# Patient Record
Sex: FEMALE | Race: BLACK OR AFRICAN AMERICAN
[De-identification: names, ages, dates, MRNs, and addresses within clinical notes are randomized per-mention and may not be internally consistent; named-entity substitution may affect disease eponyms.]

---

## 2017-08-04 ENCOUNTER — HOSPITAL ENCOUNTER (OUTPATIENT)
Dept: HOSPITAL 61 - PCVCIMAG | Age: 82
Discharge: HOME | End: 2017-08-04
Attending: INTERNAL MEDICINE
Payer: COMMERCIAL

## 2017-08-04 DIAGNOSIS — I42.8: ICD-10-CM

## 2017-08-04 DIAGNOSIS — I11.0: ICD-10-CM

## 2017-08-04 DIAGNOSIS — Z79.82: ICD-10-CM

## 2017-08-04 DIAGNOSIS — I25.10: ICD-10-CM

## 2017-08-04 DIAGNOSIS — E78.5: ICD-10-CM

## 2017-08-04 DIAGNOSIS — I50.20: ICD-10-CM

## 2017-08-04 DIAGNOSIS — I08.3: Primary | ICD-10-CM

## 2017-08-04 PROCEDURE — 93306 TTE W/DOPPLER COMPLETE: CPT

## 2017-08-07 NOTE — PCVCIMAG
--------------- APPROVED REPORT --------------





Study performed:  2017 13:29:45



EXAM: Comprehensive 2D, Doppler, and color-flow 

Echocardiogram

Patient Location: Echo lab

Status:  routine



Other Information 

Study Quality: 

Adequate



Indications

Cardiomyopathy

Hypertension/HDD

Hypertrophic cardiomyopathy



2D Dimensions

IVSd:  14.36 (7-11mm)LVOT Diam:  19.14 (18-24mm) 

LVDd:  35.49 mm

PWd:  13.19 (7-11mm)Ascending Ao:  34.40 (22-36mm)

LVDs:  14.86 (25-40mm)

Left Atrium:  43.96 (27-40mm)

Aortic Root:  21.23 mm

LV Single Plane 4CH:  66.29 %

LV Single Plane 2CH:  74.42 %Silver's LVEF:  70.36 %

Biplane EF:  69.7 %



Volumes

Left Atrial Volume (Systole)

Single Plane 4CH:  75.59 mLSingle Plane 2CH:  68.57 mL

Biplane LA Volume:  73.00 mLLA ESV Index:  45.00 mL/m2



Aortic Valve

AoV Peak Ravi.:  2.47 m/s

AO Peak Gr.:  25.04 mmHgLVOT Max P.12 mmHg

AO Mean Gr.:  14.23 mmHgLVOT Mean PG:  3.17 mmHg

AO V2 Mean:  1.81 m/sLVOT Max V:  1.17 m/s

AO V2 VTI:  61.24 cmLVOT Mean V:  0.82 m/s

RANDOLPH (VTI):  1.29 ad0CTMG V1 VTI:  27.49 cm

RANDOLPH Vmax: 1.36 cm2

SV (LVOT):  79.07 mL



Mitral Valve

E/A Ratio:  0.9

MV Decel. Time:  298.80 ms

MV E Max Ravi.:  1.16 m/s

MV A Ravi.:  1.27 m/s

IVRT:  163.01 ms



TDI

E/Lateral E':  19.33E/Medial E':  23.20

Medial E' Ravi.:  0.05 m/s

Lateral E' Ravi.:  0.06 m/s



Pulmonary Valve

PV Peak Ravi.:  0.92 m/sPV Peak Gr.:  3.42 mmHg



Pulmonary Vein

P Vein S:    0.64 m/sP Vein A:  0.25 m/s

P Vein D:   0.30 m/sP Vein A Dur.:  133.8 msec

P Vein S/D Ratio:  2.13



Tricuspid Valve

TR Peak Ravi.:  3.14 m/s

TR Peak Gr.:  39.38 mmHg

TV Vmax:  0.86 m/sPA Pressure:  46.00 mmHg



Left Ventricle

The left ventricle is normal size. There is normal LV segmental wall 

motion. Moderate concentric left ventricular hypertrophy. 

Moderate-severe basal septal hypertrophy is present.Intra-cavitary 

gradient is present. Left ventricular systolic function is normal. 

The left ventricular ejection fraction is within the normal range. 

LVEF is 65-70%. The left ventricular diastolic function is normal. 

Left atrial pressure is elevated.



Right Ventricle

The right ventricle is normal size. The right ventricular systolic 

function is normal.



Atria

Left atrium is moderately dilated. Right atrium is moderately 

dilated.



Aortic Valve

Aortic valve is trileaflet. Aortic valve leaflets are mildly 

thickened. No aortic regurgitation is present. Moderate aortic 

stenosis. Highest mean aortic valve gradient is 14mmHg. Peak aortic 

valve gradient is 24mmHg. Calculated RANDOLPH by the continuity equation 

is1.4cm2.



Mitral Valve

Moderate mitral annular calcification. The mitral valvee anulus is 

moderately thickened but opens well. Mild to moderate mitral 

regurgitation. No evidence of mitral valve stenosis.



Tricuspid Valve

The tricuspid valve is normal in structure. Moderate to severe 

tricuspid regurgitation.



Pulmonic Valve

The pulmonary valve is normal in structure. There is no pulmonic 

valvular regurgitation.



Great Vessels

The aortic root is normal in size. The ascending aorta is normal in 

size. IVC is normal in size and collapses with &gt;50% 

inspiration



Pericardium

There is no pericardial effusion. There is no pleural 

effusion.



&lt;Conclusion&gt;

The left ventricle is normal size.

Moderate concentric left ventricular hypertrophy.

Moderate-severe  basal septal hypertrophy is present.Intra-cavitary 

gradient is present.

LVEF is 65-70%.

Left atrium is moderately dilated.

Right atrium is moderately dilated.

Aortic valve is trileaflet.

Aortic valve leaflets are mildly thickened.

Moderate aortic stenosis.

Highest mean aortic valve gradient is 14mmHg.

Peak aortic valve gradient is 24mmHg.

Calculated RANDOLPH by the continuity equation is1.4cm2.

Moderate mitral annular calcification.

The mitral valvee anulus is moderately thickened but opens well.

Mild to moderate mitral regurgitation.

The tricuspid valve is normal in structure.

Moderate to severe tricuspid regurgitation.

The pulmonary valve is normal in structure.

## 2017-08-22 ENCOUNTER — HOSPITAL ENCOUNTER (OUTPATIENT)
Dept: HOSPITAL 61 - PCVCCLINIC | Age: 82
Discharge: HOME | End: 2017-08-22
Attending: INTERNAL MEDICINE
Payer: COMMERCIAL

## 2017-08-22 DIAGNOSIS — I42.2: Primary | ICD-10-CM

## 2017-08-22 DIAGNOSIS — E78.5: ICD-10-CM

## 2017-08-22 DIAGNOSIS — Z96.641: ICD-10-CM

## 2017-08-22 DIAGNOSIS — I25.2: ICD-10-CM

## 2017-08-22 DIAGNOSIS — I10: ICD-10-CM

## 2017-08-22 DIAGNOSIS — I25.10: ICD-10-CM

## 2017-08-22 DIAGNOSIS — Z79.82: ICD-10-CM

## 2017-08-22 PROCEDURE — G0463 HOSPITAL OUTPT CLINIC VISIT: HCPCS

## 2017-08-22 PROCEDURE — 93005 ELECTROCARDIOGRAM TRACING: CPT

## 2018-07-14 ENCOUNTER — HOSPITAL ENCOUNTER (INPATIENT)
Dept: HOSPITAL 35 - ER | Age: 83
LOS: 2 days | Discharge: HOME HEALTH | DRG: 689 | End: 2018-07-16
Attending: INTERNAL MEDICINE | Admitting: INTERNAL MEDICINE
Payer: COMMERCIAL

## 2018-07-14 VITALS — HEIGHT: 59.02 IN | WEIGHT: 164 LBS | BODY MASS INDEX: 33.06 KG/M2

## 2018-07-14 VITALS — DIASTOLIC BLOOD PRESSURE: 92 MMHG | SYSTOLIC BLOOD PRESSURE: 171 MMHG

## 2018-07-14 VITALS — SYSTOLIC BLOOD PRESSURE: 160 MMHG | DIASTOLIC BLOOD PRESSURE: 72 MMHG

## 2018-07-14 VITALS — DIASTOLIC BLOOD PRESSURE: 105 MMHG | SYSTOLIC BLOOD PRESSURE: 192 MMHG

## 2018-07-14 VITALS — SYSTOLIC BLOOD PRESSURE: 168 MMHG | DIASTOLIC BLOOD PRESSURE: 104 MMHG

## 2018-07-14 DIAGNOSIS — F03.90: ICD-10-CM
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
## 2018-07-14 DIAGNOSIS — I25.10: ICD-10-CM

## 2018-07-14 DIAGNOSIS — Z83.3: ICD-10-CM

## 2018-07-14 DIAGNOSIS — Z79.899: ICD-10-CM
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
## 2018-07-14 DIAGNOSIS — E78.00: ICD-10-CM

## 2018-07-14 DIAGNOSIS — Z96.651: ICD-10-CM

## 2018-07-14 DIAGNOSIS — E87.6: ICD-10-CM

## 2018-07-14 DIAGNOSIS — I10: ICD-10-CM

## 2018-07-14 DIAGNOSIS — Z82.49: ICD-10-CM

## 2018-07-14 DIAGNOSIS — E43: ICD-10-CM

## 2018-07-14 DIAGNOSIS — N39.0: Primary | ICD-10-CM

## 2018-07-14 DIAGNOSIS — M62.84: ICD-10-CM

## 2018-07-14 DIAGNOSIS — Z79.82: ICD-10-CM

## 2018-07-14 DIAGNOSIS — I25.2: ICD-10-CM

## 2018-07-14 DIAGNOSIS — E78.5: ICD-10-CM

## 2018-07-14 DIAGNOSIS — M16.0: ICD-10-CM

## 2018-07-14 LAB
ANION GAP SERPL CALC-SCNC: 10 MMOL/L (ref 7–16)
BACTERIA #/AREA URNS HPF: (no result) /HPF
BASOPHILS NFR BLD AUTO: 0.8 % (ref 0–2)
BILIRUB UR-MCNC: NEGATIVE MG/DL
BUN SERPL-MCNC: 29 MG/DL (ref 7–18)
CALCIUM SERPL-MCNC: 9.3 MG/DL (ref 8.5–10.1)
CHLORIDE SERPL-SCNC: 104 MMOL/L (ref 98–107)
CO2 SERPL-SCNC: 25 MMOL/L (ref 21–32)
COLOR UR: YELLOW
CREAT SERPL-MCNC: 1.1 MG/DL (ref 0.6–1)
EOSINOPHIL NFR BLD: 0.1 % (ref 0–3)
ERYTHROCYTE [DISTWIDTH] IN BLOOD BY AUTOMATED COUNT: 17.9 % (ref 10.5–14.5)
GLUCOSE SERPL-MCNC: 143 MG/DL (ref 74–106)
GRANULOCYTES NFR BLD MANUAL: 85.3 % (ref 36–66)
HCT VFR BLD CALC: 33.8 % (ref 37–47)
HGB BLD-MCNC: 11.1 GM/DL (ref 12–15)
KETONES UR STRIP-MCNC: (no result) MG/DL
LYMPHOCYTES NFR BLD AUTO: 5.2 % (ref 24–44)
MCH RBC QN AUTO: 27 PG (ref 26–34)
MCHC RBC AUTO-ENTMCNC: 33 G/DL (ref 28–37)
MCV RBC: 81.9 FL (ref 80–100)
MONOCYTES NFR BLD: 8.6 % (ref 1–8)
NEUTROPHILS # BLD: 10.1 THOU/UL (ref 1.4–8.2)
NITRITE UR QL STRIP: POSITIVE
PLATELET # BLD: 185 THOU/UL (ref 150–400)
POTASSIUM SERPL-SCNC: 2.7 MMOL/L (ref 3.5–5.1)
RBC # BLD AUTO: 4.12 MIL/UL (ref 4.2–5)
RBC # UR STRIP: (no result) /UL
RBC #/AREA URNS HPF: (no result) /HPF (ref 0–2)
SODIUM SERPL-SCNC: 139 MMOL/L (ref 136–145)
SP GR UR STRIP: 1.02 (ref 1–1.03)
URINE CLARITY: (no result)
URINE GLUCOSE-RANDOM*: NEGATIVE
URINE LEUKOCYTES: (no result)
URINE PROTEIN (DIPSTICK): (no result)
UROBILINOGEN UR STRIP-ACNC: 0.2 E.U./DL (ref 0.2–1)
WBC # BLD AUTO: 11.8 THOU/UL (ref 4–11)
WBC #/AREA URNS HPF: (no result) /HPF (ref 0–5)

## 2018-07-14 PROCEDURE — 10084: CPT

## 2018-07-15 VITALS — SYSTOLIC BLOOD PRESSURE: 169 MMHG | DIASTOLIC BLOOD PRESSURE: 80 MMHG

## 2018-07-15 VITALS — SYSTOLIC BLOOD PRESSURE: 156 MMHG | DIASTOLIC BLOOD PRESSURE: 63 MMHG

## 2018-07-15 VITALS — SYSTOLIC BLOOD PRESSURE: 168 MMHG | DIASTOLIC BLOOD PRESSURE: 89 MMHG

## 2018-07-15 LAB
ANION GAP SERPL CALC-SCNC: 7 MMOL/L (ref 7–16)
ANISOCYTOSIS BLD QL SMEAR: (no result)
BUN SERPL-MCNC: 27 MG/DL (ref 7–18)
CALCIUM SERPL-MCNC: 8.6 MG/DL (ref 8.5–10.1)
CHLORIDE SERPL-SCNC: 107 MMOL/L (ref 98–107)
CO2 SERPL-SCNC: 27 MMOL/L (ref 21–32)
CREAT SERPL-MCNC: 1 MG/DL (ref 0.6–1)
EOSINOPHIL NFR BLD: 3 % (ref 0–3)
ERYTHROCYTE [DISTWIDTH] IN BLOOD BY AUTOMATED COUNT: 17.2 % (ref 10.5–14.5)
GLUCOSE SERPL-MCNC: 131 MG/DL (ref 74–106)
GRANULOCYTES NFR BLD MANUAL: 74 % (ref 36–66)
HCT VFR BLD CALC: 31.5 % (ref 37–47)
HGB BLD-MCNC: 10.4 GM/DL (ref 12–15)
LYMPHOCYTES NFR BLD AUTO: 9 % (ref 24–44)
MAGNESIUM SERPL-MCNC: 1.8 MG/DL (ref 1.8–2.4)
MCH RBC QN AUTO: 27.2 PG (ref 26–34)
MCHC RBC AUTO-ENTMCNC: 33.1 G/DL (ref 28–37)
MCV RBC: 82.2 FL (ref 80–100)
MONOCYTES NFR BLD: 14 % (ref 1–8)
NEUTROPHILS # BLD: 7.1 THOU/UL (ref 1.4–8.2)
PLATELET # BLD: 198 THOU/UL (ref 150–400)
POTASSIUM SERPL-SCNC: 3.5 MMOL/L (ref 3.5–5.1)
RBC # BLD AUTO: 3.83 MIL/UL (ref 4.2–5)
SODIUM SERPL-SCNC: 141 MMOL/L (ref 136–145)
WBC # BLD AUTO: 9.6 THOU/UL (ref 4–11)

## 2018-07-16 VITALS — SYSTOLIC BLOOD PRESSURE: 133 MMHG | DIASTOLIC BLOOD PRESSURE: 78 MMHG

## 2018-07-16 VITALS — DIASTOLIC BLOOD PRESSURE: 75 MMHG | SYSTOLIC BLOOD PRESSURE: 157 MMHG

## 2018-07-16 VITALS — SYSTOLIC BLOOD PRESSURE: 157 MMHG | DIASTOLIC BLOOD PRESSURE: 75 MMHG

## 2019-04-23 ENCOUNTER — HOSPITAL ENCOUNTER (OUTPATIENT)
Dept: HOSPITAL 61 - PCVCCLINIC | Age: 84
Discharge: HOME | End: 2019-04-23
Attending: INTERNAL MEDICINE
Payer: MEDICARE

## 2019-04-23 DIAGNOSIS — I10: Primary | ICD-10-CM

## 2019-04-23 DIAGNOSIS — I42.1: ICD-10-CM

## 2019-04-23 DIAGNOSIS — R94.31: ICD-10-CM

## 2019-04-23 DIAGNOSIS — Z79.82: ICD-10-CM

## 2019-04-23 DIAGNOSIS — I35.0: ICD-10-CM

## 2019-04-23 DIAGNOSIS — E78.5: ICD-10-CM

## 2019-04-23 DIAGNOSIS — Z79.899: ICD-10-CM

## 2019-04-23 DIAGNOSIS — I25.10: ICD-10-CM

## 2019-04-23 PROCEDURE — 80061 LIPID PANEL: CPT

## 2019-04-23 PROCEDURE — G0463 HOSPITAL OUTPT CLINIC VISIT: HCPCS

## 2019-04-23 PROCEDURE — 36415 COLL VENOUS BLD VENIPUNCTURE: CPT

## 2019-04-23 PROCEDURE — 93005 ELECTROCARDIOGRAM TRACING: CPT

## 2019-10-31 ENCOUNTER — HOSPITAL ENCOUNTER (OUTPATIENT)
Dept: HOSPITAL 61 - PCVCCLINIC | Age: 84
Discharge: HOME | End: 2019-10-31
Attending: INTERNAL MEDICINE
Payer: MEDICARE

## 2019-10-31 DIAGNOSIS — Z79.82: ICD-10-CM

## 2019-10-31 DIAGNOSIS — E78.5: ICD-10-CM

## 2019-10-31 DIAGNOSIS — I10: Primary | ICD-10-CM

## 2019-10-31 DIAGNOSIS — Z79.899: ICD-10-CM

## 2019-10-31 DIAGNOSIS — F03.90: ICD-10-CM

## 2019-10-31 DIAGNOSIS — I35.0: ICD-10-CM

## 2019-10-31 PROCEDURE — 36415 COLL VENOUS BLD VENIPUNCTURE: CPT

## 2019-10-31 PROCEDURE — 80061 LIPID PANEL: CPT

## 2019-10-31 PROCEDURE — G0463 HOSPITAL OUTPT CLINIC VISIT: HCPCS

## 2019-10-31 PROCEDURE — 93005 ELECTROCARDIOGRAM TRACING: CPT

## 2019-11-26 ENCOUNTER — HOSPITAL ENCOUNTER (OUTPATIENT)
Dept: HOSPITAL 35 - MRI | Age: 84
End: 2019-11-26
Attending: PSYCHIATRY & NEUROLOGY
Payer: COMMERCIAL

## 2019-11-26 DIAGNOSIS — I67.82: ICD-10-CM

## 2019-11-26 DIAGNOSIS — G31.9: Primary | ICD-10-CM

## 2019-11-26 DIAGNOSIS — J32.9: ICD-10-CM

## 2020-04-30 ENCOUNTER — HOSPITAL ENCOUNTER (OUTPATIENT)
Dept: HOSPITAL 35 - SJCVC | Age: 85
End: 2020-04-30
Attending: INTERNAL MEDICINE
Payer: COMMERCIAL

## 2020-04-30 DIAGNOSIS — R94.31: Primary | ICD-10-CM

## 2020-04-30 DIAGNOSIS — I35.0: ICD-10-CM

## 2020-04-30 DIAGNOSIS — E78.5: ICD-10-CM

## 2020-04-30 DIAGNOSIS — I25.2: ICD-10-CM

## 2020-04-30 DIAGNOSIS — Z79.899: ICD-10-CM

## 2020-04-30 DIAGNOSIS — I10: ICD-10-CM

## 2020-04-30 DIAGNOSIS — I25.10: ICD-10-CM

## 2020-06-16 ENCOUNTER — HOSPITAL ENCOUNTER (OUTPATIENT)
Dept: HOSPITAL 35 - RAD | Age: 85
End: 2020-06-16
Attending: INTERNAL MEDICINE
Payer: COMMERCIAL

## 2020-06-16 DIAGNOSIS — J98.4: Primary | ICD-10-CM

## 2020-09-22 ENCOUNTER — HOSPITAL ENCOUNTER (EMERGENCY)
Dept: HOSPITAL 35 - ER | Age: 85
Discharge: HOME | End: 2020-09-22
Payer: COMMERCIAL

## 2020-09-22 VITALS — SYSTOLIC BLOOD PRESSURE: 173 MMHG | DIASTOLIC BLOOD PRESSURE: 90 MMHG

## 2020-09-22 VITALS — HEIGHT: 64 IN | WEIGHT: 150 LBS | BODY MASS INDEX: 25.61 KG/M2

## 2020-09-22 DIAGNOSIS — E78.5: ICD-10-CM

## 2020-09-22 DIAGNOSIS — N39.0: ICD-10-CM

## 2020-09-22 DIAGNOSIS — I25.10: ICD-10-CM

## 2020-09-22 DIAGNOSIS — M25.511: Primary | ICD-10-CM

## 2020-09-22 DIAGNOSIS — Z79.899: ICD-10-CM

## 2020-09-22 DIAGNOSIS — I10: ICD-10-CM
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
## 2020-09-22 DIAGNOSIS — Z79.82: ICD-10-CM
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
## 2020-09-22 DIAGNOSIS — I25.2: ICD-10-CM
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
## 2020-09-22 DIAGNOSIS — E87.6: ICD-10-CM

## 2020-09-22 LAB
ALBUMIN SERPL-MCNC: 3.6 G/DL (ref 3.4–5)
ALT SERPL-CCNC: 28 U/L (ref 30–65)
ANION GAP SERPL CALC-SCNC: 11 MMOL/L (ref 7–16)
AST SERPL-CCNC: 39 U/L (ref 15–37)
BACTERIA-REFLEX: (no result) /HPF
BASOPHILS NFR BLD AUTO: 0.9 % (ref 0–2)
BILIRUB SERPL-MCNC: 0.8 MG/DL (ref 0.2–1)
BILIRUB UR-MCNC: NEGATIVE MG/DL
BUN SERPL-MCNC: 42 MG/DL (ref 7–18)
CALCIUM SERPL-MCNC: 9.2 MG/DL (ref 8.5–10.1)
CHLORIDE SERPL-SCNC: 106 MMOL/L (ref 98–107)
CO2 SERPL-SCNC: 26 MMOL/L (ref 21–32)
COLOR UR: YELLOW
CREAT SERPL-MCNC: 1.1 MG/DL (ref 0.6–1)
EOSINOPHIL NFR BLD: 0.4 % (ref 0–3)
ERYTHROCYTE [DISTWIDTH] IN BLOOD BY AUTOMATED COUNT: 14.9 % (ref 10.5–14.5)
GLUCOSE SERPL-MCNC: 109 MG/DL (ref 74–106)
GRANULOCYTES NFR BLD MANUAL: 74.6 % (ref 36–66)
HCT VFR BLD CALC: 37.2 % (ref 37–47)
HGB BLD-MCNC: 12.2 GM/DL (ref 12–15)
KETONES UR STRIP-MCNC: (no result) MG/DL
LYMPHOCYTES NFR BLD AUTO: 14.3 % (ref 24–44)
MCH RBC QN AUTO: 29.2 PG (ref 26–34)
MCHC RBC AUTO-ENTMCNC: 32.7 G/DL (ref 28–37)
MCV RBC: 89.5 FL (ref 80–100)
MONOCYTES NFR BLD: 9.8 % (ref 1–8)
NEUTROPHILS # BLD: 6.5 THOU/UL (ref 1.4–8.2)
PLATELET # BLD: 211 THOU/UL (ref 150–400)
POTASSIUM SERPL-SCNC: 3.3 MMOL/L (ref 3.5–5.1)
PROT SERPL-MCNC: 7.6 G/DL (ref 6.4–8.2)
RBC # BLD AUTO: 4.16 MIL/UL (ref 4.2–5)
RBC # UR STRIP: (no result) /UL
SODIUM SERPL-SCNC: 143 MMOL/L (ref 136–145)
SP GR UR STRIP: 1.02 (ref 1–1.03)
SQUAMOUS: (no result) /LPF (ref 0–3)
URINE CLARITY: (no result)
URINE GLUCOSE-RANDOM*: NEGATIVE
URINE LEUKOCYTES-REFLEX: (no result)
URINE NITRITE-REFLEX: POSITIVE
URINE PROTEIN (DIPSTICK): (no result)
URINE WBC-REFLEX: (no result) /HPF (ref 0–5)
UROBILINOGEN UR STRIP-ACNC: 0.2 E.U./DL (ref 0.2–1)
WBC # BLD AUTO: 8.7 THOU/UL (ref 4–11)

## 2021-07-07 ENCOUNTER — HOSPITAL ENCOUNTER (EMERGENCY)
Dept: HOSPITAL 35 - ER | Age: 86
Discharge: HOME | End: 2021-07-07
Payer: COMMERCIAL

## 2021-07-07 VITALS — DIASTOLIC BLOOD PRESSURE: 101 MMHG | SYSTOLIC BLOOD PRESSURE: 170 MMHG

## 2021-07-07 VITALS — BODY MASS INDEX: 28.32 KG/M2 | WEIGHT: 150 LBS | HEIGHT: 61 IN

## 2021-07-07 DIAGNOSIS — E78.00: ICD-10-CM

## 2021-07-07 DIAGNOSIS — I10: ICD-10-CM

## 2021-07-07 DIAGNOSIS — R21: Primary | ICD-10-CM

## 2021-07-07 DIAGNOSIS — R53.83: ICD-10-CM

## 2021-07-07 DIAGNOSIS — R53.1: ICD-10-CM

## 2021-07-07 DIAGNOSIS — I25.10: ICD-10-CM

## 2021-07-07 DIAGNOSIS — Z79.899: ICD-10-CM

## 2021-07-07 DIAGNOSIS — M79.89: ICD-10-CM

## 2021-07-07 LAB
ANION GAP SERPL CALC-SCNC: 5 MMOL/L (ref 7–16)
APTT BLD: 28.5 SECONDS (ref 24.5–32.8)
BASOPHILS NFR BLD AUTO: 0.9 % (ref 0–2)
BUN SERPL-MCNC: 22 MG/DL (ref 7–18)
CALCIUM SERPL-MCNC: 8.7 MG/DL (ref 8.5–10.1)
CHLORIDE SERPL-SCNC: 106 MMOL/L (ref 98–107)
CO2 SERPL-SCNC: 32 MMOL/L (ref 21–32)
CREAT SERPL-MCNC: 1.2 MG/DL (ref 0.6–1)
EOSINOPHIL NFR BLD: 1.1 % (ref 0–3)
ERYTHROCYTE [DISTWIDTH] IN BLOOD BY AUTOMATED COUNT: 14.9 % (ref 10.5–14.5)
GLUCOSE SERPL-MCNC: 118 MG/DL (ref 74–106)
GRANULOCYTES NFR BLD MANUAL: 64.1 % (ref 36–66)
HCT VFR BLD CALC: 37.6 % (ref 37–47)
HGB BLD-MCNC: 12.2 GM/DL (ref 12–15)
INR PPP: 1.12
LYMPHOCYTES NFR BLD AUTO: 24.1 % (ref 24–44)
MCH RBC QN AUTO: 29 PG (ref 26–34)
MCHC RBC AUTO-ENTMCNC: 32.4 G/DL (ref 28–37)
MCV RBC: 89.5 FL (ref 80–100)
MONOCYTES NFR BLD: 9.8 % (ref 1–8)
NEUTROPHILS # BLD: 4.6 THOU/UL (ref 1.4–8.2)
PLATELET # BLD: 211 THOU/UL (ref 150–400)
POTASSIUM SERPL-SCNC: 3.2 MMOL/L (ref 3.5–5.1)
PROTHROMBIN TIME: 12.1 SECONDS (ref 10.5–12.1)
RBC # BLD AUTO: 4.2 MIL/UL (ref 4.2–5)
SODIUM SERPL-SCNC: 143 MMOL/L (ref 136–145)
WBC # BLD AUTO: 7.2 THOU/UL (ref 4–11)

## 2021-09-17 ENCOUNTER — HOSPITAL ENCOUNTER (INPATIENT)
Dept: HOSPITAL 35 - ER | Age: 86
LOS: 5 days | Discharge: NURSING, REHAB OR LONG-TERM CARE | DRG: 689 | End: 2021-09-22
Attending: HOSPITALIST | Admitting: HOSPITALIST
Payer: COMMERCIAL

## 2021-09-17 VITALS — HEIGHT: 62.99 IN | BODY MASS INDEX: 28.88 KG/M2 | WEIGHT: 163 LBS

## 2021-09-17 VITALS — SYSTOLIC BLOOD PRESSURE: 202 MMHG | DIASTOLIC BLOOD PRESSURE: 80 MMHG

## 2021-09-17 VITALS — SYSTOLIC BLOOD PRESSURE: 182 MMHG | DIASTOLIC BLOOD PRESSURE: 80 MMHG

## 2021-09-17 DIAGNOSIS — N30.00: Primary | ICD-10-CM

## 2021-09-17 DIAGNOSIS — G93.41: ICD-10-CM

## 2021-09-17 DIAGNOSIS — E78.00: ICD-10-CM

## 2021-09-17 DIAGNOSIS — Z20.822: ICD-10-CM

## 2021-09-17 DIAGNOSIS — F03.90: ICD-10-CM

## 2021-09-17 DIAGNOSIS — E87.6: ICD-10-CM

## 2021-09-17 DIAGNOSIS — I10: ICD-10-CM

## 2021-09-17 DIAGNOSIS — I25.2: ICD-10-CM

## 2021-09-17 DIAGNOSIS — B96.20: ICD-10-CM

## 2021-09-17 DIAGNOSIS — Z79.899: ICD-10-CM

## 2021-09-17 DIAGNOSIS — B96.1: ICD-10-CM

## 2021-09-17 DIAGNOSIS — E78.5: ICD-10-CM

## 2021-09-17 DIAGNOSIS — M16.0: ICD-10-CM

## 2021-09-17 DIAGNOSIS — I25.10: ICD-10-CM

## 2021-09-17 LAB
ALBUMIN SERPL-MCNC: 3.8 G/DL (ref 3.4–5)
ALT SERPL-CCNC: 18 U/L (ref 30–65)
ANION GAP SERPL CALC-SCNC: 9 MMOL/L (ref 7–16)
AST SERPL-CCNC: 23 U/L (ref 15–37)
BACTERIA-REFLEX: (no result) /HPF
BASOPHILS NFR BLD AUTO: 1 % (ref 0–2)
BILIRUB SERPL-MCNC: 0.8 MG/DL (ref 0.2–1)
BILIRUB UR-MCNC: NEGATIVE MG/DL
BUN SERPL-MCNC: 31 MG/DL (ref 7–18)
CALCIUM SERPL-MCNC: 9.1 MG/DL (ref 8.5–10.1)
CHLORIDE SERPL-SCNC: 104 MMOL/L (ref 98–107)
CO2 SERPL-SCNC: 29 MMOL/L (ref 21–32)
COLOR UR: YELLOW
CREAT SERPL-MCNC: 1.2 MG/DL (ref 0.6–1)
EOSINOPHIL NFR BLD: 1.1 % (ref 0–3)
ERYTHROCYTE [DISTWIDTH] IN BLOOD BY AUTOMATED COUNT: 14.2 % (ref 10.5–14.5)
GLUCOSE SERPL-MCNC: 99 MG/DL (ref 74–106)
GRANULOCYTES NFR BLD MANUAL: 70 % (ref 36–66)
HCT VFR BLD CALC: 40 % (ref 37–47)
HGB BLD-MCNC: 12.7 GM/DL (ref 12–15)
KETONES UR STRIP-MCNC: NEGATIVE MG/DL
LIPASE: 65 U/L (ref 73–393)
LYMPHOCYTES NFR BLD AUTO: 19.1 % (ref 24–44)
MCH RBC QN AUTO: 28.2 PG (ref 26–34)
MCHC RBC AUTO-ENTMCNC: 31.8 G/DL (ref 28–37)
MCV RBC: 88.5 FL (ref 80–100)
MONOCYTES NFR BLD: 8.8 % (ref 1–8)
NEUTROPHILS # BLD: 5.5 THOU/UL (ref 1.4–8.2)
PLATELET # BLD: 216 THOU/UL (ref 150–400)
POTASSIUM SERPL-SCNC: 3 MMOL/L (ref 3.5–5.1)
PROT SERPL-MCNC: 7.9 G/DL (ref 6.4–8.2)
RBC # BLD AUTO: 4.52 MIL/UL (ref 4.2–5)
RBC # UR STRIP: (no result) /UL
SODIUM SERPL-SCNC: 142 MMOL/L (ref 136–145)
SP GR UR STRIP: 1.01 (ref 1–1.03)
SQUAMOUS: (no result) /LPF (ref 0–3)
URINE CLARITY: CLEAR
URINE GLUCOSE-RANDOM*: NEGATIVE
URINE LEUKOCYTES-REFLEX: (no result)
URINE NITRITE-REFLEX: NEGATIVE
URINE PROTEIN (DIPSTICK): (no result)
URINE WBC-REFLEX: (no result) /HPF (ref 0–5)
UROBILINOGEN UR STRIP-ACNC: 0.2 E.U./DL (ref 0.2–1)
WBC # BLD AUTO: 7.9 THOU/UL (ref 4–11)

## 2021-09-17 PROCEDURE — 10081 I&D PILONIDAL CYST COMP: CPT

## 2021-09-17 PROCEDURE — 10194: CPT

## 2021-09-17 NOTE — EKG
[address] [place] [address] [facility] [address]
[place]  [address]
Phone:  [phone]                    ELECTROCARDIOGRAM REPORT      
_______________________________________________________________________________
 
Name:       BEATRIZ HERNANDEZ            Room #:                     REG   
SERENITY#:      [id number]     Account #:      [id number]  
Admission:  21    Attend Phys:                          
Discharge:              Date of Birth:  [birth year]  
                                                          Report #: [id number]
   [id number]
_______________________________________________________________________________
                         [facility] ED
                                       
Test Date:    2021               Test Time:    11:21:26
Pat Name:     BEATRIZ HERNANDEZ             Department:   
Patient ID:   [id number]            Room:          
Gender:       F                        Technician:   juan a
:          1933               Requested By: Jocy Cortes
Order Number: [id number] MD:   Oleg Valverde
                                 Measurements
Intervals                              Axis          
Rate:         69                       P:            65
IA:           189                      QRS:          -19
QRSD:         108                      T:            165
QT:           422                                    
QTc:          452                                    
                           Interpretive Statements
Sinus rhythm
Incomplete left bundle branch block
LVH with secondary repolarization abnormality
Inferior infarct, old
Lateral leads are also involved
Baseline wander in lead(s) I,III,aVL,V6
Compared to ECG 2018 11:28:55
Early repolarization now present
Myocardial infarct finding still present
Electronically Signed On 2021 14:55:03 CDT by Oleg Valverde
[email or web address]
 
 
 
 
 
 
 
 
 
 
 
 
 
 
 
 
  <ELECTRONICALLY SIGNED>
   By: Oleg Valverde MD, FAC    
  21     1455
D: 21 1121                           _____________________________________
T: 21 1121                           Oleg Valverde MD, [facility]      /EPI

## 2021-09-18 VITALS — DIASTOLIC BLOOD PRESSURE: 106 MMHG | SYSTOLIC BLOOD PRESSURE: 205 MMHG

## 2021-09-18 VITALS — SYSTOLIC BLOOD PRESSURE: 205 MMHG | DIASTOLIC BLOOD PRESSURE: 106 MMHG

## 2021-09-18 VITALS — SYSTOLIC BLOOD PRESSURE: 197 MMHG | DIASTOLIC BLOOD PRESSURE: 86 MMHG

## 2021-09-18 VITALS — SYSTOLIC BLOOD PRESSURE: 184 MMHG | DIASTOLIC BLOOD PRESSURE: 92 MMHG

## 2021-09-18 VITALS — SYSTOLIC BLOOD PRESSURE: 156 MMHG | DIASTOLIC BLOOD PRESSURE: 70 MMHG

## 2021-09-18 VITALS — DIASTOLIC BLOOD PRESSURE: 80 MMHG | SYSTOLIC BLOOD PRESSURE: 193 MMHG

## 2021-09-18 VITALS — SYSTOLIC BLOOD PRESSURE: 232 MMHG | DIASTOLIC BLOOD PRESSURE: 98 MMHG

## 2021-09-18 VITALS — DIASTOLIC BLOOD PRESSURE: 72 MMHG | SYSTOLIC BLOOD PRESSURE: 188 MMHG

## 2021-09-18 VITALS — DIASTOLIC BLOOD PRESSURE: 97 MMHG | SYSTOLIC BLOOD PRESSURE: 187 MMHG

## 2021-09-18 LAB
ANION GAP SERPL CALC-SCNC: 13 MMOL/L (ref 7–16)
BUN SERPL-MCNC: 25 MG/DL (ref 7–18)
CALCIUM SERPL-MCNC: 8.7 MG/DL (ref 8.5–10.1)
CHLORIDE SERPL-SCNC: 109 MMOL/L (ref 98–107)
CO2 SERPL-SCNC: 25 MMOL/L (ref 21–32)
CREAT SERPL-MCNC: 1.1 MG/DL (ref 0.6–1)
ERYTHROCYTE [DISTWIDTH] IN BLOOD BY AUTOMATED COUNT: 14.3 % (ref 10.5–14.5)
GLUCOSE SERPL-MCNC: 176 MG/DL (ref 74–106)
HCT VFR BLD CALC: 37.6 % (ref 37–47)
HGB BLD-MCNC: 12 GM/DL (ref 12–15)
MCH RBC QN AUTO: 28.4 PG (ref 26–34)
MCHC RBC AUTO-ENTMCNC: 32 G/DL (ref 28–37)
MCV RBC: 88.9 FL (ref 80–100)
PLATELET # BLD: 210 THOU/UL (ref 150–400)
POTASSIUM SERPL-SCNC: 3.3 MMOL/L (ref 3.5–5.1)
RBC # BLD AUTO: 4.23 MIL/UL (ref 4.2–5)
SODIUM SERPL-SCNC: 147 MMOL/L (ref 136–145)
WBC # BLD AUTO: 6.4 THOU/UL (ref 4–11)

## 2021-09-18 NOTE — NUR
NOTIFIED ISAIAS HERNÁNDEZ RE: BP- 232/98 AT 0440. NO ORDERS RECEIVED AS SHE WILL
REVIEW HER CHART AND ER ORDERS FIRST. WILL CONTINUE TO MONITOR.

## 2021-09-19 VITALS — DIASTOLIC BLOOD PRESSURE: 140 MMHG | SYSTOLIC BLOOD PRESSURE: 194 MMHG

## 2021-09-19 VITALS — SYSTOLIC BLOOD PRESSURE: 151 MMHG | DIASTOLIC BLOOD PRESSURE: 91 MMHG

## 2021-09-19 VITALS — DIASTOLIC BLOOD PRESSURE: 74 MMHG | SYSTOLIC BLOOD PRESSURE: 188 MMHG

## 2021-09-19 VITALS — SYSTOLIC BLOOD PRESSURE: 159 MMHG | DIASTOLIC BLOOD PRESSURE: 82 MMHG

## 2021-09-19 VITALS — DIASTOLIC BLOOD PRESSURE: 93 MMHG | SYSTOLIC BLOOD PRESSURE: 184 MMHG

## 2021-09-19 VITALS — SYSTOLIC BLOOD PRESSURE: 162 MMHG | DIASTOLIC BLOOD PRESSURE: 90 MMHG

## 2021-09-19 VITALS — DIASTOLIC BLOOD PRESSURE: 77 MMHG | SYSTOLIC BLOOD PRESSURE: 157 MMHG

## 2021-09-19 VITALS — DIASTOLIC BLOOD PRESSURE: 98 MMHG | SYSTOLIC BLOOD PRESSURE: 207 MMHG

## 2021-09-19 VITALS — DIASTOLIC BLOOD PRESSURE: 121 MMHG | SYSTOLIC BLOOD PRESSURE: 230 MMHG

## 2021-09-19 VITALS — SYSTOLIC BLOOD PRESSURE: 190 MMHG | DIASTOLIC BLOOD PRESSURE: 87 MMHG

## 2021-09-19 NOTE — NUR
PT ALERT AND ORIENTED X4. PT FAMILY AT BEDSIDE TODAY. PT PLEASANT AND RESTING
COMFORTABLY. PT HAD FIRM BOWEL MOVEMENT TODAY AND ORDERED DOCUSATE. FREQUENTLY
MONITORED VITALS. CHANGED IV DRESSING DURING SHIFT. PT ON FALL PRECAUTIONS. PT
DENIES NEEDS AT THIS TIME. WILL CONTINUE TO MONITOR.

## 2021-09-19 NOTE — NUR
1939 BP ELEVATED 205/106. HYDRALIZINE IVP GIVEN. RECHECKED AT 2045 184/92.
0035 /98. TO EARLY FOR HYDRALIZINE. FNP NOTIFIED AND LOPRESSOR IVP
ORDERED AND GIVEN. BP AT 0130 188/74. PATIENT SLEPT FOR SHORT TIME.
0455 BP /119. HYDRALIZINE GIVEN IVP, AM MEDICATIONS GIVEN AND
CHINO FNP NOTIFIED. ORDERED LOPRESSOR 5MG IVP ALSO AND WAS GIVEN.
0620 BP /91. WILL CONTINUE TO ASSES CLOSELY. WORKING ON GOALS AND PLAN
OF CARE FOR NOC.

## 2021-09-20 VITALS — DIASTOLIC BLOOD PRESSURE: 119 MMHG | SYSTOLIC BLOOD PRESSURE: 150 MMHG

## 2021-09-20 VITALS — DIASTOLIC BLOOD PRESSURE: 53 MMHG | SYSTOLIC BLOOD PRESSURE: 127 MMHG

## 2021-09-20 VITALS — DIASTOLIC BLOOD PRESSURE: 87 MMHG | SYSTOLIC BLOOD PRESSURE: 198 MMHG

## 2021-09-20 VITALS — DIASTOLIC BLOOD PRESSURE: 64 MMHG | SYSTOLIC BLOOD PRESSURE: 139 MMHG

## 2021-09-20 VITALS — DIASTOLIC BLOOD PRESSURE: 88 MMHG | SYSTOLIC BLOOD PRESSURE: 195 MMHG

## 2021-09-20 VITALS — DIASTOLIC BLOOD PRESSURE: 66 MMHG | SYSTOLIC BLOOD PRESSURE: 136 MMHG

## 2021-09-20 VITALS — DIASTOLIC BLOOD PRESSURE: 77 MMHG | SYSTOLIC BLOOD PRESSURE: 184 MMHG

## 2021-09-20 VITALS — DIASTOLIC BLOOD PRESSURE: 60 MMHG | SYSTOLIC BLOOD PRESSURE: 126 MMHG

## 2021-09-20 NOTE — NUR
PT UP WITH PT/OT AND HAD BOWEL MOVEMENT. PT STILL WEAK WILL NEED SOME REHAP
PER PT. WILL CONTINUE TO ASSESS.

## 2021-09-20 NOTE — NUR
1915 /87, HYDRALIZINE IVP GIVEN
2045 /93. CONTINUE TO ASSES.
2215 SPOKE WITH CHINO FNP ABOUT BP. NEW MEDICATION ORDERS OBTAINED.
2230 MEDICATIONS GIVEN. PATIENT RESTING QUIETLY WITHOUT COMPLAINTS
2325 BP /90. PATIENT SLEEPING WITHOUT COMPLAINTS
0030 NOTIFIED FNP. NO NEW ORDERS. CONTINUE TO ASSES.

## 2021-09-20 NOTE — NUR
SLEPT MOST OF SHIFT. TURNS SELF IN BED. CONTINUE TO MONITOR BLOOD PRESSURE
CLOSELY. HYDRALIZINE GIVEN AT 0417 FOR BLOOD PRESSURE /88. RECHECKED AT
0548 BP /64. WORKING ON GOALS AND PLAN OF CARE FOR NOC. CONTINUE TO
ASSES.

## 2021-09-20 NOTE — NUR
CM SPOKE WITH UNIT SW WHO REQUEST REFERRAL BE SENT TO [facility]. CM
FAXED REFERRAL AND RECEIVED FAX CONFIRMATION.

## 2021-09-20 NOTE — NUR
met with patient and dtr at bedside. patient resides in independent apt. She
uses a cane/walker as needed. She does not drive. Family assists with
transport. Discussed post acute care. Dtr reports in past she has been at
[facility]. Dtr requests skilled referral to [facility] and
[facility]. Patient admits with UTI, Weakness fall at home.

## 2021-09-21 VITALS — DIASTOLIC BLOOD PRESSURE: 67 MMHG | SYSTOLIC BLOOD PRESSURE: 137 MMHG

## 2021-09-21 VITALS — DIASTOLIC BLOOD PRESSURE: 80 MMHG | SYSTOLIC BLOOD PRESSURE: 120 MMHG

## 2021-09-21 VITALS — DIASTOLIC BLOOD PRESSURE: 78 MMHG | SYSTOLIC BLOOD PRESSURE: 139 MMHG

## 2021-09-21 VITALS — SYSTOLIC BLOOD PRESSURE: 154 MMHG | DIASTOLIC BLOOD PRESSURE: 84 MMHG

## 2021-09-21 NOTE — NUR
SLEPT MOST OF SHIFT. ASSISTED UP TO COMODE AND HAD LARGE BOWEL MOVEMENT.
WORKING ON GOALS AND PLAN OF CARE FOR NOC. PLANS FOR REHAB. DENIES COMPLAINTS
OF PAIN. CONTINUE TO ASSES CLOSELY.

## 2021-09-21 NOTE — NUR
[facility]errals to [facility] unable to accept.
[facility] able [facility]. Sp with dtr Ms Chaidez they prefer
[facility] as she has been in past. [facility] asking if DPOA paperwork present
dtr reports they do have paperwork. Tenative plan dc to [facility] in am.
Updated phys. Dtr reports they will need transport.

## 2021-09-21 NOTE — NUR
Assumed care of pt this AM. Pt is A&O x4. On RA, SR on the monitor. Slept in.
Pt c/o headache at the beginning of shift, denied tylenol. Pt reported around
lunch time that headache was gone. Pt has family visiting. Still weak. Plan to
discharge to rehab once able. Will continue to assess pt needs.

## 2021-09-22 VITALS — SYSTOLIC BLOOD PRESSURE: 149 MMHG | DIASTOLIC BLOOD PRESSURE: 55 MMHG

## 2021-09-22 VITALS — DIASTOLIC BLOOD PRESSURE: 73 MMHG | SYSTOLIC BLOOD PRESSURE: 160 MMHG

## 2021-09-22 VITALS — SYSTOLIC BLOOD PRESSURE: 148 MMHG | DIASTOLIC BLOOD PRESSURE: 72 MMHG

## 2021-09-22 NOTE — NUR
[facility] has accepted the pt and has a bed avail today.  Dc orders faxed and
confirmed. Pt's dtr Rosie is aware and will bring her a change of clothing this
afternoon. W/c van ride setup per [facility] for 2:30 to 3:30. All parties updated.
Chart copy is ready to be sent with the pt. Nursing to call report. Pt to be
skilled for therapy.

## 2021-09-22 NOTE — NUR
PATIENT RESTING IN BEDSIDE CHIAIR IN ROOM. PT IS AAOX2. PRESENTS WITH FLAT
AFFECT AND DEPRESSED MOOD. STATES THAT SHE GUESSES THAT SHE IS DOING OKAY.
STATES THAT SHE IS READY TO BE DISCHARGE. DENIES PAIN OR NEEDS. VSS. COMPLIES
WITH MEDICATION AND TREATMENT. PATIENT HAS PURWICK IN PLACE. WILL CONTINUE TO
MONITOR FOR CHANGES IN STATUS.

## 2021-09-22 NOTE — NUR
FAXED DISCHARGE ORDERS AND SUMMARY TO [facility]. CONFIRMED WITH
JUDY/ADMISSIONS THAT THEY RECEIVED.

## 2021-09-22 NOTE — NUR
Assumed care of pt this AM. Pt is A&O x3, struggles w/ day. SA/ SR on the
monitor. Pt discharging today to [facility] per transport. IV & tele
discontinued. Discharge education provided to pt & daughter, paperwork signed
& in chart. Pt left via facility transport. Denies any needs and questions at
this time.

## 2021-10-11 ENCOUNTER — HOSPITAL ENCOUNTER (INPATIENT)
Dept: HOSPITAL 35 - ER | Age: 86
LOS: 10 days | Discharge: NURSING, REHAB OR LONG-TERM CARE | DRG: 689 | End: 2021-10-21
Attending: HOSPITALIST | Admitting: HOSPITALIST
Payer: COMMERCIAL

## 2021-10-11 VITALS — BODY MASS INDEX: 30.02 KG/M2 | WEIGHT: 159 LBS | HEIGHT: 60.98 IN

## 2021-10-11 VITALS — DIASTOLIC BLOOD PRESSURE: 72 MMHG | SYSTOLIC BLOOD PRESSURE: 129 MMHG

## 2021-10-11 VITALS — SYSTOLIC BLOOD PRESSURE: 155 MMHG | DIASTOLIC BLOOD PRESSURE: 64 MMHG

## 2021-10-11 VITALS — SYSTOLIC BLOOD PRESSURE: 125 MMHG | DIASTOLIC BLOOD PRESSURE: 58 MMHG

## 2021-10-11 DIAGNOSIS — I25.2: ICD-10-CM

## 2021-10-11 DIAGNOSIS — I25.10: ICD-10-CM

## 2021-10-11 DIAGNOSIS — Z60.2: ICD-10-CM

## 2021-10-11 DIAGNOSIS — Z28.21: ICD-10-CM

## 2021-10-11 DIAGNOSIS — Z83.3: ICD-10-CM

## 2021-10-11 DIAGNOSIS — N39.0: Primary | ICD-10-CM

## 2021-10-11 DIAGNOSIS — M16.0: ICD-10-CM

## 2021-10-11 DIAGNOSIS — Z88.8: ICD-10-CM

## 2021-10-11 DIAGNOSIS — Z96.651: ICD-10-CM

## 2021-10-11 DIAGNOSIS — Z82.49: ICD-10-CM

## 2021-10-11 DIAGNOSIS — R13.10: ICD-10-CM

## 2021-10-11 DIAGNOSIS — R53.81: ICD-10-CM

## 2021-10-11 DIAGNOSIS — Z20.822: ICD-10-CM

## 2021-10-11 DIAGNOSIS — E78.5: ICD-10-CM

## 2021-10-11 DIAGNOSIS — Z23: ICD-10-CM

## 2021-10-11 DIAGNOSIS — B96.20: ICD-10-CM

## 2021-10-11 DIAGNOSIS — N18.1: ICD-10-CM

## 2021-10-11 DIAGNOSIS — E83.42: ICD-10-CM

## 2021-10-11 DIAGNOSIS — E86.0: ICD-10-CM

## 2021-10-11 DIAGNOSIS — N17.0: ICD-10-CM

## 2021-10-11 DIAGNOSIS — E87.6: ICD-10-CM

## 2021-10-11 DIAGNOSIS — F03.90: ICD-10-CM

## 2021-10-11 DIAGNOSIS — I12.9: ICD-10-CM

## 2021-10-11 LAB
ALBUMIN SERPL-MCNC: 3.4 G/DL (ref 3.4–5)
ALT SERPL-CCNC: 15 U/L (ref 30–65)
ANION GAP SERPL CALC-SCNC: 9 MMOL/L (ref 7–16)
AST SERPL-CCNC: 19 U/L (ref 15–37)
BACTERIA-REFLEX: (no result) /HPF
BASOPHILS NFR BLD AUTO: 1.3 % (ref 0–2)
BILIRUB SERPL-MCNC: 0.6 MG/DL (ref 0.2–1)
BILIRUB UR-MCNC: NEGATIVE MG/DL
BUN SERPL-MCNC: 45 MG/DL (ref 7–18)
CALCIUM SERPL-MCNC: 9 MG/DL (ref 8.5–10.1)
CHLORIDE SERPL-SCNC: 102 MMOL/L (ref 98–107)
CO2 SERPL-SCNC: 30 MMOL/L (ref 21–32)
COLOR UR: YELLOW
CREAT SERPL-MCNC: 2.6 MG/DL (ref 0.6–1)
EOSINOPHIL NFR BLD: 2.3 % (ref 0–3)
ERYTHROCYTE [DISTWIDTH] IN BLOOD BY AUTOMATED COUNT: 14.6 % (ref 10.5–14.5)
GLUCOSE SERPL-MCNC: 179 MG/DL (ref 74–106)
GRANULOCYTES NFR BLD MANUAL: 62.8 % (ref 36–66)
HCT VFR BLD CALC: 36.1 % (ref 37–47)
HGB BLD-MCNC: 11.8 GM/DL (ref 12–15)
KETONES UR STRIP-MCNC: NEGATIVE MG/DL
LYMPHOCYTES NFR BLD AUTO: 25.1 % (ref 24–44)
MAGNESIUM SERPL-MCNC: 2 MG/DL (ref 1.8–2.4)
MCH RBC QN AUTO: 28.8 PG (ref 26–34)
MCHC RBC AUTO-ENTMCNC: 32.6 G/DL (ref 28–37)
MCV RBC: 88.2 FL (ref 80–100)
MONOCYTES NFR BLD: 8.5 % (ref 1–8)
NEUTROPHILS # BLD: 4.8 THOU/UL (ref 1.4–8.2)
PHOSPHATE SERPL-MCNC: 5.2 MG/DL (ref 2.6–4.7)
PLATELET # BLD: 263 THOU/UL (ref 150–400)
POTASSIUM SERPL-SCNC: 2.9 MMOL/L (ref 3.5–5.1)
PROT SERPL-MCNC: 7.5 G/DL (ref 6.4–8.2)
RBC # BLD AUTO: 4.1 MIL/UL (ref 4.2–5)
RBC # UR STRIP: (no result) /UL
RBC #/AREA URNS HPF: (no result) /HPF
SALICYLATES SERPL-MCNC: < 2.8 MG/DL (ref 2.8–20)
SODIUM SERPL-SCNC: 141 MMOL/L (ref 136–145)
SP GR UR STRIP: 1.02 (ref 1–1.03)
SQUAMOUS: (no result) /LPF (ref 0–3)
URINE CLARITY: (no result)
URINE GLUCOSE-RANDOM*: NEGATIVE
URINE LEUKOCYTES-REFLEX: (no result)
URINE NITRITE-REFLEX: NEGATIVE
URINE PROTEIN (DIPSTICK): (no result)
URINE WBC-REFLEX: (no result) /HPF (ref 0–5)
UROBILINOGEN UR STRIP-ACNC: 0.2 E.U./DL (ref 0.2–1)
WBC # BLD AUTO: 7.6 THOU/UL (ref 4–11)

## 2021-10-11 PROCEDURE — 10045: CPT

## 2021-10-11 SDOH — SOCIAL STABILITY - SOCIAL INSECURITY: PROBLEMS RELATED TO LIVING ALONE: Z60.2

## 2021-10-12 VITALS — SYSTOLIC BLOOD PRESSURE: 137 MMHG | DIASTOLIC BLOOD PRESSURE: 67 MMHG

## 2021-10-12 VITALS — SYSTOLIC BLOOD PRESSURE: 145 MMHG | DIASTOLIC BLOOD PRESSURE: 66 MMHG

## 2021-10-12 VITALS — DIASTOLIC BLOOD PRESSURE: 79 MMHG | SYSTOLIC BLOOD PRESSURE: 150 MMHG

## 2021-10-12 VITALS — DIASTOLIC BLOOD PRESSURE: 62 MMHG | SYSTOLIC BLOOD PRESSURE: 158 MMHG

## 2021-10-12 VITALS — DIASTOLIC BLOOD PRESSURE: 82 MMHG | SYSTOLIC BLOOD PRESSURE: 181 MMHG

## 2021-10-12 LAB
ANION GAP SERPL CALC-SCNC: 9 MMOL/L (ref 7–16)
BASOPHILS NFR BLD AUTO: 0.9 % (ref 0–2)
BUN SERPL-MCNC: 27 MG/DL (ref 7–18)
CALCIUM SERPL-MCNC: 8.5 MG/DL (ref 8.5–10.1)
CHLORIDE SERPL-SCNC: 107 MMOL/L (ref 98–107)
CO2 SERPL-SCNC: 27 MMOL/L (ref 21–32)
CREAT SERPL-MCNC: 1.3 MG/DL (ref 0.6–1)
EOSINOPHIL NFR BLD: 5.5 % (ref 0–3)
ERYTHROCYTE [DISTWIDTH] IN BLOOD BY AUTOMATED COUNT: 14.6 % (ref 10.5–14.5)
GLUCOSE SERPL-MCNC: 101 MG/DL (ref 74–106)
GRANULOCYTES NFR BLD MANUAL: 60.8 % (ref 36–66)
HCT VFR BLD CALC: 35.9 % (ref 37–47)
HGB BLD-MCNC: 11.6 GM/DL (ref 12–15)
LYMPHOCYTES NFR BLD AUTO: 21.9 % (ref 24–44)
MAGNESIUM SERPL-MCNC: 1.7 MG/DL (ref 1.8–2.4)
MCH RBC QN AUTO: 28.8 PG (ref 26–34)
MCHC RBC AUTO-ENTMCNC: 32.4 G/DL (ref 28–37)
MCV RBC: 88.7 FL (ref 80–100)
MONOCYTES NFR BLD: 10.9 % (ref 1–8)
NEUTROPHILS # BLD: 4.1 THOU/UL (ref 1.4–8.2)
PLATELET # BLD: 252 THOU/UL (ref 150–400)
POTASSIUM SERPL-SCNC: 3.5 MMOL/L (ref 3.5–5.1)
RBC # BLD AUTO: 4.05 MIL/UL (ref 4.2–5)
SODIUM SERPL-SCNC: 143 MMOL/L (ref 136–145)
WBC # BLD AUTO: 6.8 THOU/UL (ref 4–11)

## 2021-10-12 NOTE — EKG
[address]  [address]
Phone:  [phone]                    ELECTROCARDIOGRAM REPORT      
_______________________________________________________________________________
 
Name:       BEATRIZ HERNANDEZ            Room #:         463-P       ADM IN  
M.R.#:      [id number]     Account #:      [id number]  
Admission:  10/11/21    Attend Phys:    Steven Stevens MD    
Discharge:              Date of Birth:  [birth year]  
                                                          Report #: [id number]
   [id number]
_______________________________________________________________________________
                         [facility] ED
                                       
Test Date:    2021-10-11               Test Time:    12:10:35
Pat Name:     BEATRIZ HERNANDEZ             Department:   
Patient ID:   [id number]            Room:         463
Gender:       F                        Technician:   TANJA
:          1933               Requested By: Luis Armando Garnica
Order Number: [id number] MD:   Oleg Valverde
                                 Measurements
Intervals                              Axis          
Rate:         58                       P:            -36
IN:           168                      QRS:          -2
QRSD:         107                      T:            212
QT:           464                                    
QTc:          456                                    
                           Interpretive Statements
Sinus rhythm
LVH with secondary repolarization abnormality
Minimal ST elevation, inferior leads
Q's III, AVF
Compared to ECG 2021 11:21:26
ST (T wave) deviation now present
Left bundle-branch block no longer present
Electronically Signed On 10- 7:28:17 CDT by Oleg Valverde
[email or web address]
 
 
 
 
 
 
 
 
 
 
 
 
 
 
 
 
 
 
  <ELECTRONICALLY SIGNED>
   By: Oleg Valverde MD, FACC    
  10/12/21     0728
D: 10/11/21 1210                           _____________________________________
T: 10/11/21 1210                           lOeg Valverde MD, [facility]      /EPI

## 2021-10-12 NOTE — NUR
ASSUMED CARE OF PT FROM ED AT 2020HRS. PT IS ALERT BUT ONLY ORIENTED TO PERSON
AND PLACE. FALL PRECAUTION IN PLACE. PT WAS ORIENTED TO THE UNIT AND HER ROOM.
PT IS A POOR HISTORIAN. PT WAS INCT THIS SHIFT. PT REPORTED SOME PAIN BUT
DENIED NAUSEA OR SOA. ASSESSMENT CHARTED. PT RAN SR ON TELE. K+ REPLACED. IVF
CONTINUED. PT WAS ABLE TO GET COMFORTABLE AND SLEEP PART OF THE SHIFT. VSS AND
NO S/S OF ACUTE DISTRESS. WILL CONTINUE TO MONITOR FOR CHANGES.

## 2021-10-12 NOTE — NUR
ASSUMED PT CARE THIS AM. PT A&OX2, ABLE TO MAKE NEEDS KNOWN. PATIENT REPORTING
NO PAIN, NUMBNESS, OR TINGLING. PATIENT REMAINS INCONTINENT, CHANGING AS
NECESSARY. PATIENT ON ROOM AIR. MEDICATIONS TAKEN WITHOUT ISSUE THIS AM.
PATIENT AND FAMILY MEMBER AT BEDSIDE REPORTING THAT PATIENT IS NOT A DIABETIC
AND DOES NOT TAKE INSULIN AT HOME, HOSPITALIST MADE AWARE OF THIS. FALL
PRECAUTIONS ARE IN PLACE, CALL LIGHT WITHIN REACH.

## 2021-10-13 VITALS — SYSTOLIC BLOOD PRESSURE: 133 MMHG | DIASTOLIC BLOOD PRESSURE: 63 MMHG

## 2021-10-13 VITALS — SYSTOLIC BLOOD PRESSURE: 130 MMHG | DIASTOLIC BLOOD PRESSURE: 55 MMHG

## 2021-10-13 VITALS — DIASTOLIC BLOOD PRESSURE: 91 MMHG | SYSTOLIC BLOOD PRESSURE: 183 MMHG

## 2021-10-13 VITALS — DIASTOLIC BLOOD PRESSURE: 89 MMHG | SYSTOLIC BLOOD PRESSURE: 164 MMHG

## 2021-10-13 VITALS — DIASTOLIC BLOOD PRESSURE: 91 MMHG | SYSTOLIC BLOOD PRESSURE: 174 MMHG

## 2021-10-13 NOTE — NUR
ASSUMED CARE OF PT AT SHIFT CHANGE. PT IS ALERT BUT ONLY ORIENTED TO PERSON
AND PLACE. FALL PRECAUTION IN PLACE. PT DENIED PAIN, NAUSEA OR SOA.
ASSESSMENT CHARTED. PT WAS INCT THIS SHIFT. PT HAD ELEVATED BP. OT HYDRALAZNE
ORDERED AND GIVEN. AM BP MEDS GIVEN EARLY. PT WAS ABLE TO GET COMFORTABLE AND
SLEEP PART OF THE SHIFT. REPORT GIVEN TO AM RN.

## 2021-10-13 NOTE — NUR
ASSUMED PT CARE THIS AM. PT A&OX2, MAKES NEEDS KNOWN. PATIENT REMAINS
INCONTINENT. PATIENT IV PATENT, FLUIDS INFUSING. MEDICATIONS TAKEN WITHOUT
ISSUE. PATIENT IRRITABLE THIS AM STATING SHE DIDNT GET MUCH REST LAST NIGHT
DUE TO STAFF COMING IN HER ROOM, EDUCATED ON IMPORTANCE OF TREATING HIGH BLOOD
PRESSURE. PATIENT UP WITH ASSIST AROUND ROOM. FALL PRECAUTIONS ARE IN PLACE,
CALL LIGHT WITHIN REACH.

## 2021-10-13 NOTE — NUR
PT ADMITTED RELATED TO ARF, UTI. CM REVIEWED CHART AND SPOKE WITH CARE TEAM.
CM CALLED AND SPOKE WITH PT'S SON THIS DAY. HE INDICATED THAT PT HAD BEEN AT
[facility] SKILLED RECENTLY SHE HAD GONE THERE FROM HERE 9/22/21. HE INDICATED THAT
PT HAD SINCE RETURNED TO HER APARTMENT WITH HOME HEALTH SERVICES AND THAT SHE
HAD CAREGIVER SERVICES M-F 4-5 HRS PER DAY. SON INDICATED THAT THEY WANTED TO
SEE IF PT COULD GO BACK TO [facility] FOR CONTINUED SKILLED REHAB STAY UPON DC. CM
INDICATED THAT CARE TEAM STATED PT MAY BE DC READY TOMORROW. CM FAXED REFERRAL
TO [facility] FOR THEM TO REVIEW. CM FOLLOWING REGARDING DC PLANNING.

## 2021-10-14 VITALS — DIASTOLIC BLOOD PRESSURE: 56 MMHG | SYSTOLIC BLOOD PRESSURE: 168 MMHG

## 2021-10-14 VITALS — SYSTOLIC BLOOD PRESSURE: 162 MMHG | DIASTOLIC BLOOD PRESSURE: 80 MMHG

## 2021-10-14 VITALS — SYSTOLIC BLOOD PRESSURE: 164 MMHG | DIASTOLIC BLOOD PRESSURE: 71 MMHG

## 2021-10-14 VITALS — SYSTOLIC BLOOD PRESSURE: 152 MMHG | DIASTOLIC BLOOD PRESSURE: 70 MMHG

## 2021-10-14 VITALS — DIASTOLIC BLOOD PRESSURE: 80 MMHG | SYSTOLIC BLOOD PRESSURE: 135 MMHG

## 2021-10-14 NOTE — NUR
CM CALLED AND SPOKE WITH ADMISSIONS JUDY AT [facility] SHE INDICATED THAT THEY DON'T
HAVE ANY BEDS AND THAT THEY WON'T UNTIL MONDAY 10/18. CM CALLED AND LEFT 
WITH PT'S MUSTAPHAR FELY. CM CALLED PT'S SON AND SPOKE WITH HIM. CM INFORMED HIM
OF THE ABOVE. CM ASKED IF THEY WANTED REFERRALS SENT ELSEWHERE INDICATING THAT
THEY HAD MENTIONED [facility] IN THE PAST OR IF THEY WERE WANTING PT TO DC
HOME WITH RESUMPTION OF HH SERVICES AND PD. HE INDICATED HE WAS GOING TO SPEAK
TO HIS SISTER AND CALL CM BACK. CM FOLLOWING.

## 2021-10-14 NOTE — NUR
Assumed pt care this am vs stable. Diet and medicatiosn are tolerated well.
Educated the pot not to use a diaper since she would leave this soaked and
not changed. Was able to walk w/ PT. POC followed with no signs or
verbalizations of distress noted. Endorsed to the night nurse.

## 2021-10-14 NOTE — NUR
PATIENT ALERT AND ORIENTED. PATIENT DENIED PAIN OR DISCOMFORT. PATIENT IN BED
ASLEEP AT THIS TIME BREATHING REGULAR AND UNLABOURED.

## 2021-10-15 VITALS — DIASTOLIC BLOOD PRESSURE: 70 MMHG | SYSTOLIC BLOOD PRESSURE: 170 MMHG

## 2021-10-15 VITALS — DIASTOLIC BLOOD PRESSURE: 59 MMHG | SYSTOLIC BLOOD PRESSURE: 181 MMHG

## 2021-10-15 VITALS — SYSTOLIC BLOOD PRESSURE: 140 MMHG | DIASTOLIC BLOOD PRESSURE: 75 MMHG

## 2021-10-15 VITALS — SYSTOLIC BLOOD PRESSURE: 164 MMHG | DIASTOLIC BLOOD PRESSURE: 81 MMHG

## 2021-10-15 VITALS — SYSTOLIC BLOOD PRESSURE: 178 MMHG | DIASTOLIC BLOOD PRESSURE: 62 MMHG

## 2021-10-15 VITALS — DIASTOLIC BLOOD PRESSURE: 64 MMHG | SYSTOLIC BLOOD PRESSURE: 169 MMHG

## 2021-10-15 LAB
ANION GAP SERPL CALC-SCNC: 7 MMOL/L (ref 7–16)
BUN SERPL-MCNC: 11 MG/DL (ref 7–18)
CALCIUM SERPL-MCNC: 8.7 MG/DL (ref 8.5–10.1)
CHLORIDE SERPL-SCNC: 106 MMOL/L (ref 98–107)
CO2 SERPL-SCNC: 30 MMOL/L (ref 21–32)
CREAT SERPL-MCNC: 1 MG/DL (ref 0.6–1)
GLUCOSE SERPL-MCNC: 149 MG/DL (ref 74–106)
POTASSIUM SERPL-SCNC: 2.9 MMOL/L (ref 3.5–5.1)
SODIUM SERPL-SCNC: 143 MMOL/L (ref 136–145)

## 2021-10-15 NOTE — NUR
Assumed pt care this am vs stable. Was able to work with PT and was able to
walk the halls. Critical lab of potassium was called MD informed orders given,
pt was refusing to take the potassium in the afternoon. Was able to give the
medication in hte evening. Incontinent of urine, poc followed with no signs or
verbalizations of distress noted. Endorsed to the night nurse.

## 2021-10-15 NOTE — NUR
Pt A/OX2-3, able to make needs known. VSS. Denied pain on assessment.
Incontinent of B&B this shift,pericare done as needed. SR on telemetry. Fall
precautions in place,resting quietly at this time,will continue to monitor pt.

## 2021-10-15 NOTE — NUR
JUAN HEARD BACK FROM SIS IN ADMISSIONS AT [facility] THIS AFTERNOON. SHE INDICATED
THAT THEY ACCAPT MEDICALLY, THAT PT WILL BE IN COP[place] DAYS AFTER 4 DAYS, AND
THAT THEY HAD SUBMITTED FOR INSURANCE AUTH. SIS INDICATED THAT THEY HAD HEARD
FROM [facility] AND THAT THEY INDICATED THAT SKILLED MAY BE DENIED AND THAT REFERRAL
WAS SENT TO THE MEDICAL DIRECTOR FOR REVIEW. SHE INDICATED THAT THERE WOULDN'T
BE A DETERMINATION UNTIL MONDAY. JUAN MET WITH PT AND DTR EMMA AT BEDSIDE THIS
AFTERNOON AND INDICATED THE ABOVE. JUAN EXPLAINED THAT SHOULD INSRUANCE DENY
SKILLED WE WOULD LOOK TO DC PT HOME WITH RESUMPTION OF [facility] HOME HEALTH
AND HER PD SERVICES. FAMILY EXPRESSED UNDERSTANDING. CM FOLLOWING REGARDING DC
PLANNING.

## 2021-10-16 VITALS — DIASTOLIC BLOOD PRESSURE: 74 MMHG | SYSTOLIC BLOOD PRESSURE: 163 MMHG

## 2021-10-16 VITALS — SYSTOLIC BLOOD PRESSURE: 166 MMHG | DIASTOLIC BLOOD PRESSURE: 87 MMHG

## 2021-10-16 NOTE — NUR
Assumed pt care vs stable. POC followed with no signs or verbalizations of
distress noted,. REfused some meals, incontinent of bladder. Endorsed to the
night nurse.

## 2021-10-17 VITALS — SYSTOLIC BLOOD PRESSURE: 153 MMHG | DIASTOLIC BLOOD PRESSURE: 84 MMHG

## 2021-10-17 VITALS — SYSTOLIC BLOOD PRESSURE: 144 MMHG | DIASTOLIC BLOOD PRESSURE: 57 MMHG

## 2021-10-17 VITALS — DIASTOLIC BLOOD PRESSURE: 77 MMHG | SYSTOLIC BLOOD PRESSURE: 165 MMHG

## 2021-10-17 VITALS — SYSTOLIC BLOOD PRESSURE: 147 MMHG | DIASTOLIC BLOOD PRESSURE: 79 MMHG

## 2021-10-17 VITALS — SYSTOLIC BLOOD PRESSURE: 158 MMHG | DIASTOLIC BLOOD PRESSURE: 76 MMHG

## 2021-10-17 VITALS — SYSTOLIC BLOOD PRESSURE: 165 MMHG | DIASTOLIC BLOOD PRESSURE: 77 MMHG

## 2021-10-17 NOTE — NUR
ASSUMED PT CARE THIS AM. PATIENT A&OX2, ABLE TO MAKE NEEDS KNOWN. PATIENT
REPORTING NO PAIN, NUMBNESS, OR TINGLING. IV REMAINS PATENT, SALINE LOCKED.
PATIENT REMAINS INCONTINENT, CHANGING AS NEEDED. PATIENT TOOK MORNING
MEDICATIONS WITHOUT ISSUE. PATIENT REMAINS ON ROOM AIR. PATIENT IS ON
TELEMETRY. FALL PRECAUTIONS ARE IN PLACE, CALL LIGHT WITHIN REACH.

## 2021-10-17 NOTE — NUR
Pt. rested quietly during the night when checked on during frequent rounds.
She offers no c/o pain or discomfort.  Incontinent of urine and corrie care
given.  Bed alarm is on.

## 2021-10-18 VITALS — SYSTOLIC BLOOD PRESSURE: 145 MMHG | DIASTOLIC BLOOD PRESSURE: 76 MMHG

## 2021-10-18 VITALS — DIASTOLIC BLOOD PRESSURE: 66 MMHG | SYSTOLIC BLOOD PRESSURE: 145 MMHG

## 2021-10-18 VITALS — DIASTOLIC BLOOD PRESSURE: 68 MMHG | SYSTOLIC BLOOD PRESSURE: 139 MMHG

## 2021-10-18 VITALS — SYSTOLIC BLOOD PRESSURE: 153 MMHG | DIASTOLIC BLOOD PRESSURE: 80 MMHG

## 2021-10-18 NOTE — NUR
Pt A & O x3 with forgettfulness noted. Pt Vs stable. Pt received medications
as ordered. Pt is x 1 assist with ADLs and cares. Pt is incontent of bladder.
Pt uses walker for mobility. Pt is NSR on the tele. Pt is able to make needs
known

## 2021-10-18 NOTE — NUR
Assess due to length of stay. Advanced age 88. Admitted with dehydration, UTI,
weakness, debility, decreased appetite.  Wt has been stable, intake about 60%
of meals.  Chart reviewed, no PEG recommendation per provider.  Add oral
supplement ensure, otherwise low nutrition risk with current nutrition
interventions in place

## 2021-10-18 NOTE — NUR
PATIENT AMBULATES IN THE ROOM WITH STEADY GAITS. PATIENT ENCOURAGED
FLUIDS.FALL PRECAUTION IN PLACE.  PATIENT IN BED ASLEEP AT THIS TIME BREATHING
REGULAR AND UNLABOURED.

## 2021-10-18 NOTE — NUR
JULIUS reviewed chart and spoke with nursing and attending physician. Pt is
progressing towards goals for discharge. Awaiting insurance auth for pt to go
to [facility]. JULIUS left voice message for Kendy in admissions to
follow up. Should insurance deny, pt will discharge home with [facility]. JULIUS
met with pt and family at bedside to provide update. Both are aware and
agreeable with plan. JULIUS spoke with [facility] in intake at [facility] to notify of
pt's discharge plan. [facility] states they are able to accept pt back on service
when she discharges home. Awaiting call back from [facility] at this
time. JULIUS is following to assist as needed with discharge planning.

## 2021-10-19 VITALS — SYSTOLIC BLOOD PRESSURE: 141 MMHG | DIASTOLIC BLOOD PRESSURE: 62 MMHG

## 2021-10-19 VITALS — SYSTOLIC BLOOD PRESSURE: 140 MMHG | DIASTOLIC BLOOD PRESSURE: 66 MMHG

## 2021-10-19 VITALS — SYSTOLIC BLOOD PRESSURE: 152 MMHG | DIASTOLIC BLOOD PRESSURE: 73 MMHG

## 2021-10-19 NOTE — NUR
ASSUMED PT CARE THIS PM. PT IS ALERT AND ORIENTED WITH FORGETFULNESS. PT DID
NOT C/O PAIN. PT IS INCONTINENT. PT DID NOT VERBALIZE ANY CONCERNS. MEDS WERE
GIVEN PER EMAR ORDERS. PT IS ON RA. FALL PRECAUTIONS IN PLACE. WILL CONTINUE
TO MONITOR.

## 2021-10-19 NOTE — NUR
Alert and confused at times. walked in the hallway with a walker and stand by
assist, tolerated well.

## 2021-10-19 NOTE — NUR
CM HEARD FROM SIS AT [facility] THIS AM SHE INDICATED THAT PT'S REFERRAL WAS STILL
SHOWING IN MEDICAL REVIEW. CM FOLLOWING REGARDING DISCHARGE PLANNING AND PREP.

## 2021-10-20 VITALS — SYSTOLIC BLOOD PRESSURE: 117 MMHG | DIASTOLIC BLOOD PRESSURE: 55 MMHG

## 2021-10-20 VITALS — SYSTOLIC BLOOD PRESSURE: 141 MMHG | DIASTOLIC BLOOD PRESSURE: 86 MMHG

## 2021-10-20 VITALS — SYSTOLIC BLOOD PRESSURE: 153 MMHG | DIASTOLIC BLOOD PRESSURE: 89 MMHG

## 2021-10-20 VITALS — SYSTOLIC BLOOD PRESSURE: 117 MMHG | DIASTOLIC BLOOD PRESSURE: 68 MMHG

## 2021-10-20 VITALS — DIASTOLIC BLOOD PRESSURE: 76 MMHG | SYSTOLIC BLOOD PRESSURE: 153 MMHG

## 2021-10-20 VITALS — SYSTOLIC BLOOD PRESSURE: 129 MMHG | DIASTOLIC BLOOD PRESSURE: 74 MMHG

## 2021-10-20 NOTE — NUR
PT A&OX 2-3 WITH FORGETFULLNESS NOTED. PT RECEIVED MEDICATIONS AS ORDERED. PT
WORKED WITH PT/OT THIS SHIFT. PT IS ROOM AIR. PT IS X1 ASSIST WITH ADLS AND
CARES. PT USES WALKER AND GAIT BLET FOR MOBILITY. PT IS ABLE TO MAKE NEEDS
KNOWN.

## 2021-10-20 NOTE — NUR
PT CARE ASSUMED WITH PT IN THE CHAIR.PT IS A/O X3.PT IS UP WITH X1 ASSIST TO
BSC/BATHROOM AND WITH WALKER AND GAIT BELT.IV ACCESS ON LT AC SL.PT TAKES MEDS
WHOLE IN YOGURT.PT IS ON ROOM AIR.PT IS INCONTINENT.WILL CONTINUE TO MONITOR
PER POC

## 2021-10-21 VITALS — SYSTOLIC BLOOD PRESSURE: 138 MMHG | DIASTOLIC BLOOD PRESSURE: 88 MMHG

## 2021-10-21 NOTE — NUR
[facility] HAD CALLED AND INDICATED THAT THEY HAD AUTH FOR PT TO DC FOR SKILLED THERE
THIS DAY. COVID TEST AND ORDERS WERE FAXED.  VAN TRANSPORT WERE ARRANGED FOR
1330. CM NOTIFIED PT AND DTR EMMA. EMMA CAME TO HOSPITAL TO SEE PT OFF.
PHYSICAIN WAS ASKED FOR PAPER SCRIPT FOR NARCOTICS AS FACILITY HAD REQUESTED
THEM. THEY WEREN'T PROVIDED. CM CALLED HOSPITALIST AFTER DC TO GET SCRIPT TO
SEND TO FACILITY AND NURSE INDICATED PT HADN'T BEEN GETTING MEDICAITION AND
THAT THEREFOR SHE WASN'T GOING TO WRITE A SCRIPT FOR IT. CM HAD TO CALLED THE
FACILITY AND INDICATE THIS TO WHICH THEY STATED THEN WHY WAS IT ON HER DC MED
LIST.... NO OTHER CM INTERVENTION INDICATED. CASE CLOSED.

## 2021-10-21 NOTE — NUR
IV AND TELE DISCONTINUED. WENT OVER NEW MED LIST WITH PT'S DAUGHTER. TRIED TO
CALL REPORT TO [facility] HOWEVER NO RN ASWERED THE PHONE. PT DISCHARGED
TO FACILITY VIA WHEELCHAIR VAN.

## 2021-10-21 NOTE — NUR
ASSUMED PT CARE THIS PM. PT IS ALERT AND ORIENTED X3. PT CAN BE FORGETFUL
SOMETIMES. PT IS ACHS BUT FAMILY DOES NOT WANT IT TO BE CHECKED. PT IS UPX1
WITH WALKER TO BR. PT IS INCONTINENT AT NIGHT. MEDS WERE GIVEN PER EMAR
ORDERS. PT IS ON RA. FALL PRECAUTIONS IN PLACE. WILL CONTINUE TO MONITOR.

## 2021-11-16 ENCOUNTER — HOSPITAL ENCOUNTER (EMERGENCY)
Dept: HOSPITAL 35 - ER | Age: 86
Discharge: HOME | End: 2021-11-16
Payer: COMMERCIAL

## 2021-11-16 VITALS — BODY MASS INDEX: 37.3 KG/M2 | WEIGHT: 189.99 LBS | HEIGHT: 60 IN

## 2021-11-16 VITALS — SYSTOLIC BLOOD PRESSURE: 152 MMHG | DIASTOLIC BLOOD PRESSURE: 74 MMHG

## 2021-11-16 DIAGNOSIS — I25.10: ICD-10-CM

## 2021-11-16 DIAGNOSIS — Z79.891: ICD-10-CM

## 2021-11-16 DIAGNOSIS — F02.80: ICD-10-CM

## 2021-11-16 DIAGNOSIS — R60.0: Primary | ICD-10-CM

## 2021-11-16 DIAGNOSIS — I10: ICD-10-CM

## 2021-11-16 DIAGNOSIS — Z88.8: ICD-10-CM

## 2021-11-16 DIAGNOSIS — Z79.1: ICD-10-CM

## 2021-11-16 DIAGNOSIS — E78.00: ICD-10-CM

## 2021-11-16 DIAGNOSIS — E87.1: ICD-10-CM

## 2021-11-16 DIAGNOSIS — Z79.899: ICD-10-CM

## 2021-11-16 LAB
ALBUMIN SERPL-MCNC: 3.1 G/DL (ref 3.4–5)
ALT SERPL-CCNC: 15 U/L (ref 30–65)
ANION GAP SERPL CALC-SCNC: 7 MMOL/L (ref 7–16)
AST SERPL-CCNC: 19 U/L (ref 15–37)
BILIRUB SERPL-MCNC: 0.6 MG/DL (ref 0.2–1)
BILIRUB UR-MCNC: NEGATIVE MG/DL
BUN SERPL-MCNC: 20 MG/DL (ref 7–18)
CALCIUM SERPL-MCNC: 8.7 MG/DL (ref 8.5–10.1)
CHLORIDE SERPL-SCNC: 97 MMOL/L (ref 98–107)
CO2 SERPL-SCNC: 26 MMOL/L (ref 21–32)
COLOR UR: YELLOW
CREAT SERPL-MCNC: 1 MG/DL (ref 0.6–1)
ERYTHROCYTE [DISTWIDTH] IN BLOOD BY AUTOMATED COUNT: 14.8 % (ref 10.5–14.5)
GLUCOSE SERPL-MCNC: 105 MG/DL (ref 74–106)
HCT VFR BLD CALC: 32.3 % (ref 37–47)
HGB BLD-MCNC: 10.5 GM/DL (ref 12–15)
KETONES UR STRIP-MCNC: NEGATIVE MG/DL
MCH RBC QN AUTO: 28.4 PG (ref 26–34)
MCHC RBC AUTO-ENTMCNC: 32.4 G/DL (ref 28–37)
MCV RBC: 87.7 FL (ref 80–100)
PLATELET # BLD: 246 THOU/UL (ref 150–400)
POTASSIUM SERPL-SCNC: 3.5 MMOL/L (ref 3.5–5.1)
PROT SERPL-MCNC: 7.5 G/DL (ref 6.4–8.2)
RBC # BLD AUTO: 3.68 MIL/UL (ref 4.2–5)
RBC # UR STRIP: (no result) /UL
SODIUM SERPL-SCNC: 130 MMOL/L (ref 136–145)
SP GR UR STRIP: <= 1.005 (ref 1–1.03)
URINE CLARITY: CLEAR
URINE GLUCOSE-RANDOM*: NEGATIVE
URINE LEUKOCYTES-REFLEX: (no result)
URINE NITRITE-REFLEX: NEGATIVE
URINE PROTEIN (DIPSTICK): NEGATIVE
UROBILINOGEN UR STRIP-ACNC: 0.2 E.U./DL (ref 0.2–1)
WBC # BLD AUTO: 9.2 THOU/UL (ref 4–11)

## 2021-11-17 NOTE — EKG
[facility] [place]  [address]
Phone:  [phone]                    ELECTROCARDIOGRAM REPORT      
_______________________________________________________________________________
 
Name:       BEATRIZ HERNANDEZ            [facility] #:                     [id number]     Account #:      [id number]  
Admission:  21    Attend Phys:                          
Discharge:  21    Date of Birth:  [birth year]  
                                                          Report #: [id number]
   [id number]
_______________________________________________________________________________
                         [facility] ED
                                       
Test Date:    2021               Test Time:    22:04:26
Pat Name:     BEATRIZ HERNANDEZ             Department:   
Patient ID:   [id number]            Room:          
Gender:       F                        Technician:   mamta briggs
:          1933               Requested By: Medardo Landaverde
Order Number: [id number] MD:   Oleg Valverde
                                 Measurements
Intervals                              Axis          
Rate:         77                       P:            69
DC:           186                      QRS:          -17
QRSD:         103                      T:            204
QT:           400                                    
QTc:          453                                    
                           Interpretive Statements
Sinus rhythm
LVH with secondary repolarization abnormality
Inferior infarct, old
Minimal ST elevation, anterolateral leads
Compared to ECG 10/11/2021 12:10:35
Myocardial infarct finding now present
ST (T wave) deviation still present
Electronically Signed On 2021 7:25:46 CST by Oleg Valverde
[email or web address]
 
 
 
 
 
 
 
 
 
 
 
 
 
 
 
 
 
 
  <ELECTRONICALLY SIGNED>
   By: Oleg Valverde MD, [facility]    
  21     07
D: 21                           _____________________________________
T: 21                           Oleg Valverde MD, [facility]      /EPI

## 2022-01-13 ENCOUNTER — HOSPITAL ENCOUNTER (OUTPATIENT)
Dept: HOSPITAL 35 - SJCVC | Age: 87
End: 2022-01-13
Attending: INTERNAL MEDICINE
Payer: COMMERCIAL

## 2022-01-13 DIAGNOSIS — I25.10: ICD-10-CM

## 2022-01-13 DIAGNOSIS — Z88.5: ICD-10-CM

## 2022-01-13 DIAGNOSIS — E78.5: ICD-10-CM

## 2022-01-13 DIAGNOSIS — R60.0: ICD-10-CM

## 2022-01-13 DIAGNOSIS — I42.2: Primary | ICD-10-CM

## 2022-01-13 DIAGNOSIS — Z79.899: ICD-10-CM

## 2022-01-13 DIAGNOSIS — Z82.49: ICD-10-CM

## 2022-01-13 DIAGNOSIS — I10: ICD-10-CM

## 2022-02-15 ENCOUNTER — HOSPITAL ENCOUNTER (EMERGENCY)
Dept: HOSPITAL 35 - ER | Age: 87
LOS: 1 days | Discharge: HOME | End: 2022-02-16
Payer: COMMERCIAL

## 2022-02-15 VITALS — HEIGHT: 62 IN | WEIGHT: 164.99 LBS | BODY MASS INDEX: 30.36 KG/M2

## 2022-02-15 DIAGNOSIS — I25.10: ICD-10-CM

## 2022-02-15 DIAGNOSIS — Z20.822: ICD-10-CM

## 2022-02-15 DIAGNOSIS — I25.2: ICD-10-CM

## 2022-02-15 DIAGNOSIS — Z88.8: ICD-10-CM

## 2022-02-15 DIAGNOSIS — R77.8: ICD-10-CM

## 2022-02-15 DIAGNOSIS — B96.89: ICD-10-CM

## 2022-02-15 DIAGNOSIS — E78.00: ICD-10-CM

## 2022-02-15 DIAGNOSIS — I10: ICD-10-CM

## 2022-02-15 DIAGNOSIS — F02.80: ICD-10-CM

## 2022-02-15 DIAGNOSIS — Z79.899: ICD-10-CM

## 2022-02-15 DIAGNOSIS — N39.0: Primary | ICD-10-CM

## 2022-02-15 LAB
ALBUMIN SERPL-MCNC: 3.1 G/DL (ref 3.4–5)
ALT SERPL-CCNC: 20 U/L (ref 14–59)
ANION GAP SERPL CALC-SCNC: 11 MMOL/L (ref 7–16)
AST SERPL-CCNC: 32 U/L (ref 15–37)
BACTERIA-REFLEX: (no result) /HPF
BASOPHILS NFR BLD AUTO: 0.4 % (ref 0–2)
BILIRUB SERPL-MCNC: 0.7 MG/DL (ref 0.2–1)
BILIRUB UR-MCNC: NEGATIVE MG/DL
BUN SERPL-MCNC: 32 MG/DL (ref 7–18)
CALCIUM SERPL-MCNC: 9.5 MG/DL (ref 8.5–10.1)
CHLORIDE SERPL-SCNC: 101 MMOL/L (ref 98–107)
CO2 SERPL-SCNC: 26 MMOL/L (ref 21–32)
COLOR UR: YELLOW
CREAT SERPL-MCNC: 1.1 MG/DL (ref 0.6–1)
EOSINOPHIL NFR BLD: 0.3 % (ref 0–3)
ERYTHROCYTE [DISTWIDTH] IN BLOOD BY AUTOMATED COUNT: 15.6 % (ref 10.5–14.5)
GLUCOSE SERPL-MCNC: 146 MG/DL (ref 74–106)
GRANULOCYTES NFR BLD MANUAL: 81.3 % (ref 36–66)
HCT VFR BLD CALC: 31.5 % (ref 37–47)
HGB BLD-MCNC: 10.1 GM/DL (ref 12–15)
KETONES UR STRIP-MCNC: NEGATIVE MG/DL
LYMPHOCYTES NFR BLD AUTO: 9.3 % (ref 24–44)
MCH RBC QN AUTO: 27 PG (ref 26–34)
MCHC RBC AUTO-ENTMCNC: 32.1 G/DL (ref 28–37)
MCV RBC: 84.1 FL (ref 80–100)
MONOCYTES NFR BLD: 8.7 % (ref 1–8)
MUCUS: (no result) STRN/LPF
NEUTROPHILS # BLD: 11.1 THOU/UL (ref 1.4–8.2)
PLATELET # BLD: 287 THOU/UL (ref 150–400)
POTASSIUM SERPL-SCNC: 3.4 MMOL/L (ref 3.5–5.1)
PROT SERPL-MCNC: 7.9 G/DL (ref 6.4–8.2)
RBC # BLD AUTO: 3.75 MIL/UL (ref 4.2–5)
RBC # UR STRIP: (no result) /UL
RBC #/AREA URNS HPF: (no result) /HPF
SODIUM SERPL-SCNC: 138 MMOL/L (ref 136–145)
SP GR UR STRIP: 1.01 (ref 1–1.03)
SQUAMOUS: (no result) /LPF (ref 0–3)
URINE CLARITY: CLEAR
URINE GLUCOSE-RANDOM*: NEGATIVE
URINE LEUKOCYTES-REFLEX: (no result)
URINE NITRITE-REFLEX: NEGATIVE
URINE PROTEIN (DIPSTICK): NEGATIVE
UROBILINOGEN UR STRIP-ACNC: 0.2 E.U./DL (ref 0.2–1)
WBC # BLD AUTO: 13.7 THOU/UL (ref 4–11)

## 2022-02-16 VITALS — DIASTOLIC BLOOD PRESSURE: 65 MMHG | SYSTOLIC BLOOD PRESSURE: 128 MMHG

## 2022-02-16 NOTE — EKG
[facility]
[place]  [address]
Phone:  [phone]                    ELECTROCARDIOGRAM REPORT      
_______________________________________________________________________________
 
Name:       BEATRIZ HERNANDEZ            [facility]                     [id number]     Account #:      [id number]  
Admission:  02/15/22    Attend Phys:                          
Discharge:  22    Date of Birth:  [birth year]  
                                                          Report #: [id number]
   [id number]
_______________________________________________________________________________
                         [facility] ED
                                       
Test Date:    2022-02-15               Test Time:    21:48:47
Pat Name:     BEATRIZ HERNANDEZ             Department:   
Patient ID:   [id number]            Room:          
Gender:       F                        Technician:   POLY
:          1933               Requested By: Germaine Benoit
Order Number: [id number] MD:   Olge Valverde
                                 Measurements
Intervals                              Axis          
Rate:         93                       P:            72
OH:           194                      QRS:          -30
QRSD:         104                      T:            132
QT:           357                                    
QTc:          445                                    
                           Interpretive Statements
Sinus rhythm
Multiple premature complexes, vent & supraven
LVH with secondary repolarization abnormality
Compared to ECG 2021 22:04:26
Myocardial infarct finding no longer present
ST (T wave) deviation no longer present
Electronically Signed On 2022 7:17:45 CST by Oleg Valverde
[email or web address]
 
 
 
 
 
 
 
 
 
 
 
 
 
 
 
 
 
 
 
  <ELECTRONICALLY SIGNED>
   By: Oleg Valverde MD, [facility]    
  22     0717
D: 02/15/22 2148                           _____________________________________
T: 02/15/22 2148                           Oleg Valverde MD, [facility]      /EPI